# Patient Record
Sex: MALE | Race: OTHER | HISPANIC OR LATINO
[De-identification: names, ages, dates, MRNs, and addresses within clinical notes are randomized per-mention and may not be internally consistent; named-entity substitution may affect disease eponyms.]

---

## 2023-05-25 PROBLEM — Z00.00 ENCOUNTER FOR PREVENTIVE HEALTH EXAMINATION: Status: ACTIVE | Noted: 2023-05-25

## 2023-06-21 ENCOUNTER — APPOINTMENT (OUTPATIENT)
Dept: OTOLARYNGOLOGY | Facility: CLINIC | Age: 23
End: 2023-06-21
Payer: COMMERCIAL

## 2023-06-21 ENCOUNTER — NON-APPOINTMENT (OUTPATIENT)
Age: 23
End: 2023-06-21

## 2023-06-21 VITALS — BODY MASS INDEX: 40.81 KG/M2 | HEIGHT: 67 IN | TEMPERATURE: 96.8 F | WEIGHT: 260 LBS

## 2023-06-21 DIAGNOSIS — H61.23 IMPACTED CERUMEN, BILATERAL: ICD-10-CM

## 2023-06-21 DIAGNOSIS — H93.8X3 OTHER SPECIFIED DISORDERS OF EAR, BILATERAL: ICD-10-CM

## 2023-06-21 PROCEDURE — 99204 OFFICE O/P NEW MOD 45 MIN: CPT | Mod: 25

## 2023-06-21 PROCEDURE — 92504 EAR MICROSCOPY EXAMINATION: CPT

## 2023-06-21 RX ORDER — OFLOXACIN OTIC 3 MG/ML
0.3 SOLUTION AURICULAR (OTIC) TWICE DAILY
Qty: 1 | Refills: 1 | Status: ACTIVE | COMMUNITY
Start: 2023-06-21 | End: 1900-01-01

## 2023-06-21 NOTE — PROCEDURE
[Risk and Benefits Discussed] : The purpose, risks, discomforts, benefits and alternatives of the procedure have been explained to the patient including no treatment. [Same] : same as the Pre Op Dx. [] : Binocular Microscopy [FreeTextEntry1] : Left otorhea - - ? problems hearing left  - occ right  [FreeTextEntry4] : NONE [FreeTextEntry6] : bilat cerumen and sl purulence bilat - suctioned - tm sl inflammed

## 2023-06-21 NOTE — HISTORY OF PRESENT ILLNESS
[de-identified] : c/o clogged ear - left ear - (sister translating) - problem for more than 4-5 years.  No hx of prior ear problems.

## 2023-06-21 NOTE — REASON FOR VISIT
[Initial Consultation] : an initial consultation for [Family Member] : family member [Other: _____] : [unfilled] [FreeTextEntry2] : clogged ear

## 2023-06-21 NOTE — PHYSICAL EXAM
[Midline] : trachea located in midline position [Normal] : no rashes [de-identified] : bilat OE and  some purulence in EAC bilat worse left  [de-identified] : appears normal bilat

## 2023-06-21 NOTE — ASSESSMENT
[FreeTextEntry1] : Patient with problems hearing left ear more than right although as child right ear ?had problem.  Bilat OE noted with some otorrhea - ears suctioned and recommended dry ear precautions and started on ofloxacin drops.  Follow up in 2 weeks.  Do audio when ears clear.

## 2023-07-18 ENCOUNTER — APPOINTMENT (OUTPATIENT)
Dept: OTOLARYNGOLOGY | Facility: CLINIC | Age: 23
End: 2023-07-18

## 2023-08-09 ENCOUNTER — APPOINTMENT (OUTPATIENT)
Dept: OTOLARYNGOLOGY | Facility: CLINIC | Age: 23
End: 2023-08-09
Payer: COMMERCIAL

## 2023-08-09 VITALS — WEIGHT: 260 LBS | HEIGHT: 67 IN | BODY MASS INDEX: 40.81 KG/M2

## 2023-08-09 DIAGNOSIS — H60.93 UNSPECIFIED OTITIS EXTERNA, BILATERAL: ICD-10-CM

## 2023-08-09 DIAGNOSIS — H90.3 SENSORINEURAL HEARING LOSS, BILATERAL: ICD-10-CM

## 2023-08-09 PROCEDURE — 92567 TYMPANOMETRY: CPT

## 2023-08-09 PROCEDURE — 92557 COMPREHENSIVE HEARING TEST: CPT

## 2023-08-09 PROCEDURE — 99214 OFFICE O/P EST MOD 30 MIN: CPT | Mod: 25

## 2023-08-09 NOTE — HISTORY OF PRESENT ILLNESS
[de-identified] : Patient here for followup of OE - now better - no sx.  Did have hearing issues as child.

## 2023-08-09 NOTE — ASSESSMENT
[FreeTextEntry1] : Patient here for follow up of OE bilat - also concerns about hearing.  OE now resolved - audio and tymp normal .  follouwp in one year and as necessary